# Patient Record
Sex: MALE | Race: WHITE | URBAN - METROPOLITAN AREA
[De-identification: names, ages, dates, MRNs, and addresses within clinical notes are randomized per-mention and may not be internally consistent; named-entity substitution may affect disease eponyms.]

---

## 2019-06-03 ENCOUNTER — TELEPHONE (OUTPATIENT)
Dept: PRIMARY CARE CLINIC | Age: 24
End: 2019-06-03

## 2019-06-03 DIAGNOSIS — Z13.220 SCREENING CHOLESTEROL LEVEL: Primary | ICD-10-CM

## 2019-06-28 ENCOUNTER — TELEPHONE (OUTPATIENT)
Dept: PRIMARY CARE CLINIC | Age: 24
End: 2019-06-28

## 2019-07-10 LAB
A/G RATIO: 2.6 (ref 1.1–2.2)
ALBUMIN SERPL-MCNC: 5.1 G/DL (ref 3.4–5)
ALP BLD-CCNC: 80 U/L (ref 40–129)
ALT SERPL-CCNC: 97 U/L (ref 10–40)
ANION GAP SERPL CALCULATED.3IONS-SCNC: 14 MMOL/L (ref 3–16)
AST SERPL-CCNC: 47 U/L (ref 15–37)
BILIRUB SERPL-MCNC: 0.4 MG/DL (ref 0–1)
BUN BLDV-MCNC: 25 MG/DL (ref 7–20)
CALCIUM SERPL-MCNC: 9.7 MG/DL (ref 8.3–10.6)
CHLORIDE BLD-SCNC: 105 MMOL/L (ref 99–110)
CHOLESTEROL, TOTAL: 181 MG/DL (ref 0–199)
CO2: 23 MMOL/L (ref 21–32)
CREAT SERPL-MCNC: 1.2 MG/DL (ref 0.9–1.3)
GFR AFRICAN AMERICAN: >60
GFR NON-AFRICAN AMERICAN: >60
GLOBULIN: 2 G/DL
GLUCOSE BLD-MCNC: 88 MG/DL (ref 70–99)
HDLC SERPL-MCNC: 32 MG/DL (ref 40–60)
LDL CHOLESTEROL CALCULATED: 116 MG/DL
POTASSIUM SERPL-SCNC: 4.3 MMOL/L (ref 3.5–5.1)
SODIUM BLD-SCNC: 142 MMOL/L (ref 136–145)
TOTAL PROTEIN: 7.1 G/DL (ref 6.4–8.2)
TRIGL SERPL-MCNC: 165 MG/DL (ref 0–150)
VLDLC SERPL CALC-MCNC: 33 MG/DL

## 2019-07-11 PROBLEM — E78.5 DYSLIPIDEMIA: Status: ACTIVE | Noted: 2019-07-11

## 2019-07-12 ENCOUNTER — OFFICE VISIT (OUTPATIENT)
Dept: PRIMARY CARE CLINIC | Age: 24
End: 2019-07-12
Payer: COMMERCIAL

## 2019-07-12 VITALS
BODY MASS INDEX: 30.24 KG/M2 | HEART RATE: 79 BPM | HEIGHT: 70 IN | DIASTOLIC BLOOD PRESSURE: 69 MMHG | WEIGHT: 211.2 LBS | SYSTOLIC BLOOD PRESSURE: 127 MMHG

## 2019-07-12 DIAGNOSIS — R74.8 ELEVATED LIVER ENZYMES: ICD-10-CM

## 2019-07-12 DIAGNOSIS — E78.5 DYSLIPIDEMIA: ICD-10-CM

## 2019-07-12 DIAGNOSIS — Z00.00 WELL ADULT EXAM: Primary | ICD-10-CM

## 2019-07-12 DIAGNOSIS — N43.40 SPERMATOCELE OF EPIDIDYMIS: ICD-10-CM

## 2019-07-12 PROCEDURE — 99395 PREV VISIT EST AGE 18-39: CPT | Performed by: FAMILY MEDICINE

## 2019-07-12 ASSESSMENT — ENCOUNTER SYMPTOMS
EYE PAIN: 0
NAUSEA: 0
VOMITING: 0
ABDOMINAL PAIN: 0
SHORTNESS OF BREATH: 0
RHINORRHEA: 0
DIARRHEA: 0
COUGH: 0
CONSTIPATION: 0
SORE THROAT: 0
COLOR CHANGE: 0

## 2019-07-12 ASSESSMENT — PATIENT HEALTH QUESTIONNAIRE - PHQ9
SUM OF ALL RESPONSES TO PHQ9 QUESTIONS 1 & 2: 1
2. FEELING DOWN, DEPRESSED OR HOPELESS: 0
SUM OF ALL RESPONSES TO PHQ QUESTIONS 1-9: 1
1. LITTLE INTEREST OR PLEASURE IN DOING THINGS: 1
SUM OF ALL RESPONSES TO PHQ QUESTIONS 1-9: 1

## 2019-07-12 NOTE — PATIENT INSTRUCTIONS
You may receive a survey regarding the care you received during your visit. Your input is valuable to us. We encourage you to complete and return your survey. We hope you will choose us in the future for your healthcare needs. Wt Readings from Last 3 Encounters:   07/12/19 211 lb 3.2 oz (95.8 kg)   06/24/14 164 lb (74.4 kg) (71 %, Z= 0.54)*   06/20/14 171 lb (77.6 kg) (78 %, Z= 0.77)*     * Growth percentiles are based on [facility] (Boys, 2-20 Years) data. BP Readings from Last 3 Encounters:   07/12/19 127/69   06/24/14 104/74   06/20/14 (!) 126/98       Here are some recommendations for weight loss:  Check into The GI Diet or \"Primal diet\"    Take your desired weight in pounds and multiply by 10 and that is your average daily calorie allowance. For example if you wish to weigh 170 lb x 10 = 1700 lloyd/day (this is how to gradually lose the weight and maintain your desired weight). Avoid soda/coke and all \"wet carbs\" => Drink ice water instead    Drink a large glass of ice water before meals and EAT SLOWLY (talk while you eat)! Rethink your hunger => it means your losing weight. Minimize carbohydrates as they stimulate your appetite.   Avoid Bread, Rice, Pasta and Potatoes      Avoid eating calories after 6 pm    Consider getting [employer] emma for your phone  Consider getting a FitBit to track your activity

## 2020-07-09 ENCOUNTER — NURSE ONLY (OUTPATIENT)
Dept: PRIMARY CARE CLINIC | Age: 25
End: 2020-07-09
Payer: COMMERCIAL

## 2020-07-09 PROCEDURE — 99211 OFF/OP EST MAY X REQ PHY/QHP: CPT | Performed by: NURSE PRACTITIONER

## 2020-07-09 NOTE — PROGRESS NOTES
Charlie Davis received a viral test for COVID-19. They were educated on isolation and quarantine as appropriate. For any symptoms, they were directed to seek care from their PCP, given contact information to establish with a doctor, directed to an urgent care or the emergency room.

## 2020-07-13 LAB
SARS-COV-2: NOT DETECTED
SOURCE: NORMAL

## 2020-10-14 ENCOUNTER — OFFICE VISIT (OUTPATIENT)
Dept: PRIMARY CARE CLINIC | Age: 25
End: 2020-10-14
Payer: COMMERCIAL

## 2020-10-14 PROCEDURE — 99211 OFF/OP EST MAY X REQ PHY/QHP: CPT | Performed by: NURSE PRACTITIONER

## 2020-10-14 NOTE — PROGRESS NOTES
Luís Mora received a viral test for COVID-19. They were educated on isolation and quarantine as appropriate. For any symptoms, they were directed to seek care from their PCP, given contact information to establish with a doctor, directed to an urgent care or the emergency room.

## 2020-10-15 LAB — SARS-COV-2, PCR: NOT DETECTED

## 2020-10-15 NOTE — RESULT ENCOUNTER NOTE

## 2022-01-27 ENCOUNTER — OFFICE VISIT (OUTPATIENT)
Dept: PRIMARY CARE CLINIC | Age: 27
End: 2022-01-27
Payer: COMMERCIAL

## 2022-01-27 VITALS
DIASTOLIC BLOOD PRESSURE: 71 MMHG | SYSTOLIC BLOOD PRESSURE: 127 MMHG | TEMPERATURE: 97.5 F | WEIGHT: 228 LBS | BODY MASS INDEX: 32.64 KG/M2 | HEART RATE: 79 BPM | HEIGHT: 70 IN

## 2022-01-27 DIAGNOSIS — R53.83 FATIGUE, UNSPECIFIED TYPE: Primary | ICD-10-CM

## 2022-01-27 PROCEDURE — 99213 OFFICE O/P EST LOW 20 MIN: CPT | Performed by: FAMILY MEDICINE

## 2022-01-27 SDOH — ECONOMIC STABILITY: FOOD INSECURITY: WITHIN THE PAST 12 MONTHS, THE FOOD YOU BOUGHT JUST DIDN'T LAST AND YOU DIDN'T HAVE MONEY TO GET MORE.: NEVER TRUE

## 2022-01-27 SDOH — ECONOMIC STABILITY: FOOD INSECURITY: WITHIN THE PAST 12 MONTHS, YOU WORRIED THAT YOUR FOOD WOULD RUN OUT BEFORE YOU GOT MONEY TO BUY MORE.: NEVER TRUE

## 2022-01-27 ASSESSMENT — PATIENT HEALTH QUESTIONNAIRE - PHQ9
10. IF YOU CHECKED OFF ANY PROBLEMS, HOW DIFFICULT HAVE THESE PROBLEMS MADE IT FOR YOU TO DO YOUR WORK, TAKE CARE OF THINGS AT HOME, OR GET ALONG WITH OTHER PEOPLE: 0
1. LITTLE INTEREST OR PLEASURE IN DOING THINGS: 0
8. MOVING OR SPEAKING SO SLOWLY THAT OTHER PEOPLE COULD HAVE NOTICED. OR THE OPPOSITE, BEING SO FIGETY OR RESTLESS THAT YOU HAVE BEEN MOVING AROUND A LOT MORE THAN USUAL: 0
3. TROUBLE FALLING OR STAYING ASLEEP: 0
SUM OF ALL RESPONSES TO PHQ QUESTIONS 1-9: 2
SUM OF ALL RESPONSES TO PHQ9 QUESTIONS 1 & 2: 0
5. POOR APPETITE OR OVEREATING: 0
SUM OF ALL RESPONSES TO PHQ QUESTIONS 1-9: 2
SUM OF ALL RESPONSES TO PHQ QUESTIONS 1-9: 2
4. FEELING TIRED OR HAVING LITTLE ENERGY: 2
2. FEELING DOWN, DEPRESSED OR HOPELESS: 0
7. TROUBLE CONCENTRATING ON THINGS, SUCH AS READING THE NEWSPAPER OR WATCHING TELEVISION: 0
SUM OF ALL RESPONSES TO PHQ QUESTIONS 1-9: 2
6. FEELING BAD ABOUT YOURSELF - OR THAT YOU ARE A FAILURE OR HAVE LET YOURSELF OR YOUR FAMILY DOWN: 0

## 2022-01-27 ASSESSMENT — SOCIAL DETERMINANTS OF HEALTH (SDOH): HOW HARD IS IT FOR YOU TO PAY FOR THE VERY BASICS LIKE FOOD, HOUSING, MEDICAL CARE, AND HEATING?: NOT HARD AT ALL

## 2022-01-27 NOTE — PROGRESS NOTES
Chief Complaint   Patient presents with    Insomnia    Sleep Apnea       HPI: Javier Freed  presents for evaluation and management of difficulty with sleep. Javier Shown notes that he is snoring very loudly. It is disrupting other members of the household and he states he sounds like his father who uses CPAP. He notes he sometimes wakes up with a headache in the morning and also sometimes feels tired. He is not napping currently. Today's PHQ:    PHQ Scores 1/27/2022 7/12/2019   PHQ2 Score 0 1   PHQ9 Score 2 1     Interpretation of Total Score Depression Severity: 1-4 = Minimal depression, 5-9 = Mild depression, 10-14 = Moderate depression, 15-19 = Moderately severe depression, 20-27 = Severe depression        Review of Systems    No Known Allergies  New Prescriptions    No medications on file     Current Outpatient Medications   Medication Sig Dispense Refill    mometasone (NASONEX) 50 MCG/ACT nasal spray 2 sprays by Nasal route daily. 1 Inhaler 3     No current facility-administered medications for this visit. Past Medical History:   Diagnosis Date    Collar bone fracture     Dyslipidemia 7/11/2019    Glossitis 6/20/2014    Leg fracture, left     Seasonal allergies          Objective   /71 (Site: Right Upper Arm, Position: Sitting, Cuff Size: Medium Adult)   Pulse 79   Temp 97.5 °F (36.4 °C) (Axillary)   Ht 5' 10\" (1.778 m)   Wt 228 lb (103.4 kg)   BMI 32.71 kg/m²   Wt Readings from Last 3 Encounters:   01/27/22 228 lb (103.4 kg)   07/12/19 211 lb 3.2 oz (95.8 kg)   06/24/14 164 lb (74.4 kg) (71 %, Z= 0.54)*     * Growth percentiles are based on CDC (Boys, 2-20 Years) data. Physical Exam  Constitutional:       Appearance: He is well-developed. HENT:      Mouth/Throat:      Comments: Class III airway  Neck:      Comments: 16 inch neck  Cardiovascular:      Rate and Rhythm: Normal rate and regular rhythm. Heart sounds: No murmur heard. No friction rub. No gallop.     Pulmonary: Effort: Pulmonary effort is normal.      Breath sounds: Normal breath sounds. No wheezing or rales. Abdominal:      General: Bowel sounds are normal. There is no distension. Palpations: Abdomen is soft. There is no mass. Tenderness: There is no abdominal tenderness. Skin:     General: Skin is warm and dry. Findings: No rash. Chemistry        Component Value Date/Time     07/10/2019 1341    K 4.3 07/10/2019 1341     07/10/2019 1341    CO2 23 07/10/2019 1341    BUN 25 (H) 07/10/2019 1341    CREATININE 1.2 07/10/2019 1341        Component Value Date/Time    CALCIUM 9.7 07/10/2019 1341    ALKPHOS 80 07/10/2019 1341    AST 47 (H) 07/10/2019 1341    ALT 97 (H) 07/10/2019 1341    BILITOT 0.4 07/10/2019 1341          Lab Results   Component Value Date    WBC 5.6 06/20/2014    HGB 16.4 06/20/2014    HCT 46.6 06/20/2014    MCV 80.9 06/20/2014     06/20/2014     No results found for: LABA1C  No results found for: EAG  No results found for: LABA1C  No components found for: CHLPL  Lab Results   Component Value Date    TRIG 165 (H) 07/10/2019    TRIG 194 (H) 04/10/2013     Lab Results   Component Value Date    HDL 32 (L) 07/10/2019    HDL 26 (L) 04/10/2013     Lab Results   Component Value Date    LDLCALC 116 (H) 07/10/2019    LDLCALC 72 04/10/2013     Lab Results   Component Value Date    LABVLDL 33 07/10/2019    LABVLDL 39 04/10/2013         Assessment   Plan   1. Fatigue, unspecified type  With remarkable snoring. Patient brought in recordings of his snoring with him today. Suspect he may have sleep apnea. We will consult sleep medicine and follow-up as needed  - Nick Davis MD, Pulmonary, [facility]    Discussed use, benefit, and side effects of prescribed medications. Barriers to medication compliance addressed. All patient questions answered. Pt voiced understanding. RTC No follow-ups on file.

## 2022-01-27 NOTE — Clinical Note
Dr. Otoniel Nesbitt, I think Pearletha Peabody may have sleep apnea. Would you assist me with an evaluation? Thanks.   Patsy Pepe

## 2022-01-28 ENCOUNTER — TELEPHONE (OUTPATIENT)
Dept: PULMONOLOGY | Age: 27
End: 2022-01-28

## 2022-01-28 NOTE — TELEPHONE ENCOUNTER
Dr Marissa Fuentes   Pt did call and was scheduled on 2-15-22  Do you need to seen sooner ? Or is the 15th ok?

## 2022-02-15 ENCOUNTER — OFFICE VISIT (OUTPATIENT)
Dept: PULMONOLOGY | Age: 27
End: 2022-02-15
Payer: COMMERCIAL

## 2022-02-15 VITALS
OXYGEN SATURATION: 98 % | RESPIRATION RATE: 16 BRPM | TEMPERATURE: 97.6 F | BODY MASS INDEX: 32.93 KG/M2 | SYSTOLIC BLOOD PRESSURE: 120 MMHG | WEIGHT: 230 LBS | DIASTOLIC BLOOD PRESSURE: 80 MMHG | HEART RATE: 104 BPM | HEIGHT: 70 IN

## 2022-02-15 DIAGNOSIS — R06.81 WITNESSED EPISODE OF APNEA: Primary | ICD-10-CM

## 2022-02-15 PROCEDURE — 99203 OFFICE O/P NEW LOW 30 MIN: CPT | Performed by: INTERNAL MEDICINE

## 2022-02-15 ASSESSMENT — ENCOUNTER SYMPTOMS: RESPIRATORY NEGATIVE: 1

## 2022-02-15 NOTE — ASSESSMENT & PLAN NOTE
·  Rick Simon's history of insomnia, snoring, witnessed apneic events, daily morning headaches, obesity, nocturia raises concern for the possibility of underlying obstructive sleep apnea syndrome. I have discussed with the patient as to what obstructive sleep apnea syndrome is and it's health related adverse consequences. Its treatment modalities including the use of CPAP, oral appliances and surgical procedures have also been discussed. CPAP being noninvasive and very effective would generally be considered first-line treatment for obstructive sleep apnea syndrome, in the absence of any contraindications to it's use. As a first step I have recommended a home sleep study with reflex to AutoPap if positive this has been discussed with him and he is agreeable to this plan. ·  he should maintain an optimal body weight as this would help further in the management of any underlying sleep apnea. .    · followup has been advised in 3 months after his sleep study. · if obstructive sleep apnea syndrome, is found and is prescribed CPAP, then he would need to use it for at least 4 hours a night in order to meet minimum insurance reimbursement requirements for CPAP. However to derive optimal benefit from CPAP, preferably he should use it for the entire duration of his sleep.

## 2022-02-15 NOTE — Clinical Note
[facility] Pulmonology Sleep and Critical Care  [facility]. [address] [facility]  Phone: [phone]  Fax: [phone]    Malika Karimi MD        February 15, 2022     Patient: Bambi Tam   YOB: 1995   Date of Visit: 2/15/2022       To Whom It May Concern: It is my medical opinion that Ana Levy {Work release (duty restriction):58315}. If you have any questions or concerns, please don't hesitate to call.     Sincerely,        Malika Karimi MD

## 2022-02-15 NOTE — PROGRESS NOTES
[address] [place] [address] (:  1995) is a 23 y.o. male,New patient, here for evaluation of the following chief complaint(s):  New Patient and Sleep Apnea (2013 sleep study  since symptoms increasing )         ASSESSMENT/PLAN:  1. Witnessed episode of apnea  Assessment & Plan:  ·  Judith Simon's history of insomnia, snoring, witnessed apneic events, daily morning headaches, obesity, nocturia raises concern for the possibility of underlying obstructive sleep apnea syndrome. I have discussed with the patient as to what obstructive sleep apnea syndrome is and it's health related adverse consequences. Its treatment modalities including the use of CPAP, oral appliances and surgical procedures have also been discussed. CPAP being noninvasive and very effective would generally be considered first-line treatment for obstructive sleep apnea syndrome, in the absence of any contraindications to it's use. As a first step I have recommended a home sleep study with reflex to AutoPap if positive this has been discussed with him and he is agreeable to this plan. ·  he should maintain an optimal body weight as this would help further in the management of any underlying sleep apnea. .    · followup has been advised in 3 months after his sleep study. · if obstructive sleep apnea syndrome, is found and is prescribed CPAP, then he would need to use it for at least 4 hours a night in order to meet minimum insurance reimbursement requirements for CPAP. However to derive optimal benefit from CPAP, preferably he should use it for the entire duration of his sleep. Orders:  -     Home Sleep Study; Future      No follow-ups on file. Future Appointments   Date Time Provider [place]ana Whitlock   2022  9:40 AM Lorenzo Wilson MD [facility]            Subjective   SUBJECTIVE/OBJECTIVE:  Presents for evaluation of possible obstructive sleep apnea.   Patient states his dad has sleep apnea and he is beginning to have the same symptoms. Specifically he reports:  Loud snorer  Wakes up frequently, witnessed apneic events. BMI >30  Daily am headaches  Nightly nocturia    Does not fall asleep while driving. No flowsheet data found. EPWORTH - 12      Review of Systems   Constitutional: Negative. HENT: Negative. Respiratory: Negative. Cardiovascular: Negative. Musculoskeletal: Negative. Neurological: Positive for headaches. Psychiatric/Behavioral: Negative. Objective   Physical Exam     Gen:  No acute distress. Eyes: PERRL. EOMI. Anicteric sclera. No conjunctival injection. ENT: No discharge. oropharynx clear. External appearance of ears and nose normal.  Neck: Trachea midline. No mass   Resp:  No crackles. No wheezes. No rhonchi. No dullness on percussion. CV: Regular rate. Regular rhythm. No murmur or rub. No edema. GI: Soft, Non-tender. Non-distended. +BS  Skin: Warm, dry, w/o erythema. Lymph: No cervical or supraclavicular LAD. M/S: No cyanosis. No clubbing. Neuro:  no focal neurologic deficit. Moves all extremities  Psych: Awake and alert, Oriented x 3. Judgement and insight appropriate. Mood stable. I spent 33 minutes with the patient, >50% was face-to-face in discussion of the diagnosis and the coordination of care in regards to the treatment plan. All questions answered. Note from Dr. Cam Conde reviewed as part of this visit. An electronic signature was used to authenticate this note.     --Mason Mcdowell MD

## 2022-02-15 NOTE — Clinical Note
[facility] Pulmonology Sleep and Critical Care  [place]   Phone: [phone]  Fax: [phone]    Rafita Funk MD        February 15, 2022     Patient: Glenda Cochran   YOB: 1995   Date of Visit: 2/15/2022       To Whom it May Concern:    Chin Lee was seen in my clinic on 2/15/2022. He {Return to school/sport/work:34837}. If you have any questions or concerns, please don't hesitate to call.     Sincerely,         Rafita Funk MD

## 2022-02-15 NOTE — LETTER
[facility] Pulmonology Sleep and Critical Care  [facility] [address]   Phone: [phone]  Fax: [phone]           Kayla Rodríguez MD      February 15, 2022     Patient: Ryland Blizzard   MR Number: <[id number]>   YOB: 1995   Date of Visit: 2/15/2022       Dear Dr. Chaves Console: Thank you for referring Kimmy Rios to me for evaluation/treatment. Below are the relevant portions of my assessment and plan of care. ASSESSMENT/PLAN:  1. Witnessed episode of apnea  -     Home Sleep Study; Future      No follow-ups on file. Future Appointments   Date Time Provider [place]ana Whitlock   5/19/2022  9:40 AM Kayla Rodríguez  [facility]       If you have questions, please do not hesitate to call me. I look forward to following Leslie Martin along with you.     Sincerely,        Kayla Rodríguez MD    CC providers:  Toma Escobar MD  [address]  [address] [place] [address]  [address]

## 2022-02-15 NOTE — LETTER
[facility] Pulmonology Sleep and Critical Care  [facility]. [address] [facility]  Phone: [phone]  Fax: [phone]    Ania Sims MD    February 15, 2022     Janki Jacobs MD  [address] [place] [address]    Patient: Hermelindo Arriola   MR Number: <[id number]>   YOB: 1995   Date of Visit: 2/15/2022       Dear Janki Jacobs:    Thank you for referring Valiant Batch to me for evaluation/treatment. Below are the relevant portions of my assessment and plan of care. ASSESSMENT/PLAN:  1. Witnessed episode of apnea  -     Home Sleep Study; Future      No follow-ups on file. Future Appointments   Date Time Provider [place]ana Whitlock   5/19/2022  9:40 AM Ania Sims MD [facility] PULM [facility]       If you have questions, please do not hesitate to call me. I look forward to following Paul Granda along with you.     Sincerely,      Ania Sims MD

## 2022-02-25 ENCOUNTER — HOSPITAL ENCOUNTER (OUTPATIENT)
Dept: SLEEP CENTER | Age: 27
Discharge: HOME | End: 2022-02-25
Payer: COMMERCIAL

## 2022-02-25 DIAGNOSIS — R06.81 WITNESSED EPISODE OF APNEA: ICD-10-CM

## 2022-02-25 PROCEDURE — 95806 SLEEP STUDY UNATT&RESP EFFT: CPT

## 2022-03-02 ENCOUNTER — TELEPHONE (OUTPATIENT)
Dept: PULMONOLOGY | Age: 27
End: 2022-03-02

## 2022-03-02 NOTE — TELEPHONE ENCOUNTER
Sleep study    Moderate ISABEL  AHI  19.4 an hr  O2 sat 85%    Dr recommends  Auto pap 5-15    Pt notified  Will call back with DME

## 2022-03-02 NOTE — TELEPHONE ENCOUNTER
Ashley Ivey calls back and will use [facility] as his new DME. Please send orders to [facility]. Patient was informed from [facility] they were not sure when machine would be available due to cpap machine shortage. Patient has new insurance coverage with [employer]  And is attached.

## 2022-03-09 PROCEDURE — 95806 SLEEP STUDY UNATT&RESP EFFT: CPT | Performed by: PSYCHIATRY & NEUROLOGY

## 2022-07-06 ENCOUNTER — OFFICE VISIT (OUTPATIENT)
Dept: PULMONOLOGY | Age: 27
End: 2022-07-06
Payer: COMMERCIAL

## 2022-07-06 VITALS
SYSTOLIC BLOOD PRESSURE: 100 MMHG | OXYGEN SATURATION: 97 % | BODY MASS INDEX: 32.21 KG/M2 | WEIGHT: 225 LBS | HEART RATE: 89 BPM | HEIGHT: 70 IN | DIASTOLIC BLOOD PRESSURE: 80 MMHG | TEMPERATURE: 98.4 F | RESPIRATION RATE: 16 BRPM

## 2022-07-06 DIAGNOSIS — G47.33 OSA (OBSTRUCTIVE SLEEP APNEA): Primary | ICD-10-CM

## 2022-07-06 PROCEDURE — 99213 OFFICE O/P EST LOW 20 MIN: CPT | Performed by: INTERNAL MEDICINE

## 2022-07-06 ASSESSMENT — SLEEP AND FATIGUE QUESTIONNAIRES
ESS TOTAL SCORE: 9
HOW LIKELY ARE YOU TO NOD OFF OR FALL ASLEEP WHILE SITTING INACTIVE IN A PUBLIC PLACE: 1
HOW LIKELY ARE YOU TO NOD OFF OR FALL ASLEEP WHILE SITTING AND READING: 1
HOW LIKELY ARE YOU TO NOD OFF OR FALL ASLEEP IN A CAR, WHILE STOPPED FOR A FEW MINUTES IN TRAFFIC: 0
HOW LIKELY ARE YOU TO NOD OFF OR FALL ASLEEP WHEN YOU ARE A PASSENGER IN A CAR FOR AN HOUR WITHOUT A BREAK: 2
HOW LIKELY ARE YOU TO NOD OFF OR FALL ASLEEP WHILE SITTING AND TALKING TO SOMEONE: 0
HOW LIKELY ARE YOU TO NOD OFF OR FALL ASLEEP WHILE LYING DOWN TO REST IN THE AFTERNOON WHEN CIRCUMSTANCES PERMIT: 2
HOW LIKELY ARE YOU TO NOD OFF OR FALL ASLEEP WHILE SITTING QUIETLY AFTER LUNCH WITHOUT ALCOHOL: 2
HOW LIKELY ARE YOU TO NOD OFF OR FALL ASLEEP WHILE WATCHING TV: 1

## 2022-07-06 ASSESSMENT — ENCOUNTER SYMPTOMS: RESPIRATORY NEGATIVE: 1

## 2022-07-06 NOTE — PROGRESS NOTES
[address] [facility] (:  1995) is a 33 y.o. male,New patient, here for evaluation of the following chief complaint(s):  Sleep Apnea and Follow-up         ASSESSMENT/PLAN:  1. ISABEL (obstructive sleep apnea)  Assessment & Plan: Moderate ISABEL  AHI  19.4 an hr  O2 sat 85%  -On AutoPap 5-15  -Is having trouble with acclamation  -Will decrease initial pressure and prolong ramp time  -Have encouraged him to wear while awake to help acclamation process  -Advised him he needs to be wearing for more than 4 hours preferably for the entire duration of sleep  -We will send an order for full facemask  -Return to clinic in 30 days for compliance recheck      Return in about 4 weeks (around 8/3/2022). Future Appointments   Date Time Provider [place]ana Whitlock   2022  9:00 AM Jose Alfredo Bermudez MD [facility]            Subjective   SUBJECTIVE/OBJECTIVE:  Since last visit patient had PSG that showed moderate ISABEL with an AHI of 19 O2 rohan 85%. -Started on AutoPap 5-15 but is having trouble acclimating only wearing to hours a night at most.  -Has worn 0 of 30 nights for greater than 4 hours for compliance rate of 0%    Sleep Medicine 2022   Sitting and reading 1   Watching TV 1   Sitting, inactive in a public place (e.g. a theatre or a meeting) 1   As a passenger in a car for an hour without a break 2   Lying down to rest in the afternoon when circumstances permit 2   Sitting and talking to someone 0   Sitting quietly after a lunch without alcohol 2   In a car, while stopped for a few minutes in traffic 0   Total score 9     EPWORTH - 9      Review of Systems   Constitutional: Negative. HENT: Negative. Respiratory: Negative. Cardiovascular: Negative. Musculoskeletal: Negative. Neurological: Positive for headaches. Psychiatric/Behavioral: Negative. Objective   Physical Exam     Gen:  No acute distress. Eyes: PERRL. EOMI.  Anicteric sclera. No conjunctival injection. ENT: No discharge. oropharynx clear. External appearance of ears and nose normal.  Neck: Trachea midline. No mass   Resp:  No crackles. No wheezes. No rhonchi. No dullness on percussion. CV: Regular rate. Regular rhythm. No murmur or rub. No edema. GI: Soft, Non-tender. Non-distended. +BS  Skin: Warm, dry, w/o erythema. Lymph: No cervical or supraclavicular LAD. M/S: No cyanosis. No clubbing. Neuro:  no focal neurologic deficit. Moves all extremities  Psych: Awake and alert, Oriented x 3. Judgement and insight appropriate. Mood stable. I spent 21 minutes with the patient, >50% was face-to-face in discussion of the diagnosis and the coordination of care in regards to the treatment plan. All questions answered. Note from Dr. Eron iDaz reviewed as part of this visit. An electronic signature was used to authenticate this note.     --Iliana Kimble MD

## 2022-07-06 NOTE — ASSESSMENT & PLAN NOTE
Moderate ISABEL  AHI  19.4 an hr  O2 sat 85%  -On AutoPap 5-15  -Is having trouble with acclamation  -Will decrease initial pressure and prolong ramp time  -Have encouraged him to wear while awake to help acclamation process  -Advised him he needs to be wearing for more than 4 hours preferably for the entire duration of sleep  -We will send an order for full facemask  -Return to clinic in 30 days for compliance recheck

## 2022-08-04 ENCOUNTER — OFFICE VISIT (OUTPATIENT)
Dept: PULMONOLOGY | Age: 27
End: 2022-08-04
Payer: COMMERCIAL

## 2022-08-04 VITALS
TEMPERATURE: 97.5 F | DIASTOLIC BLOOD PRESSURE: 70 MMHG | SYSTOLIC BLOOD PRESSURE: 110 MMHG | WEIGHT: 227 LBS | RESPIRATION RATE: 16 BRPM | HEART RATE: 76 BPM | BODY MASS INDEX: 32.5 KG/M2 | OXYGEN SATURATION: 97 % | HEIGHT: 70 IN

## 2022-08-04 DIAGNOSIS — G47.33 OSA (OBSTRUCTIVE SLEEP APNEA): Primary | ICD-10-CM

## 2022-08-04 PROCEDURE — G8417 CALC BMI ABV UP PARAM F/U: HCPCS | Performed by: INTERNAL MEDICINE

## 2022-08-04 PROCEDURE — G8427 DOCREV CUR MEDS BY ELIG CLIN: HCPCS | Performed by: INTERNAL MEDICINE

## 2022-08-04 PROCEDURE — 99213 OFFICE O/P EST LOW 20 MIN: CPT | Performed by: INTERNAL MEDICINE

## 2022-08-04 PROCEDURE — 1036F TOBACCO NON-USER: CPT | Performed by: INTERNAL MEDICINE

## 2022-08-04 ASSESSMENT — SLEEP AND FATIGUE QUESTIONNAIRES
HOW LIKELY ARE YOU TO NOD OFF OR FALL ASLEEP IN A CAR, WHILE STOPPED FOR A FEW MINUTES IN TRAFFIC: 0
ESS TOTAL SCORE: 4
HOW LIKELY ARE YOU TO NOD OFF OR FALL ASLEEP WHILE SITTING QUIETLY AFTER LUNCH WITHOUT ALCOHOL: 0
HOW LIKELY ARE YOU TO NOD OFF OR FALL ASLEEP WHILE SITTING AND READING: 1
HOW LIKELY ARE YOU TO NOD OFF OR FALL ASLEEP WHILE SITTING INACTIVE IN A PUBLIC PLACE: 0
HOW LIKELY ARE YOU TO NOD OFF OR FALL ASLEEP WHILE SITTING AND TALKING TO SOMEONE: 0
HOW LIKELY ARE YOU TO NOD OFF OR FALL ASLEEP WHILE LYING DOWN TO REST IN THE AFTERNOON WHEN CIRCUMSTANCES PERMIT: 1
HOW LIKELY ARE YOU TO NOD OFF OR FALL ASLEEP WHEN YOU ARE A PASSENGER IN A CAR FOR AN HOUR WITHOUT A BREAK: 1
HOW LIKELY ARE YOU TO NOD OFF OR FALL ASLEEP WHILE WATCHING TV: 1

## 2022-08-04 NOTE — PROGRESS NOTES
[address] [facility] (:  1995) is a 33 y.o. male,New patient, here for evaluation of the following chief complaint(s):  Sleep Apnea and Follow-up         ASSESSMENT/PLAN:  1. ISABEL (obstructive sleep apnea)  Assessment & Plan: Moderate ISABEL  AHI  19.4 an hr  O2 sat 85%  -On AutoPap 5-15  -Still having trouble with acclamation  -Back on nasal pillows  -We discussed the need to wear for more than 4 hours, every night, preferably for the entire duration of sleep, he states he will continue to work on this.  -We will see him back for compliance report  -Has SUN machine      Return in about 3 months (around 2022). No future appointments. Subjective   SUBJECTIVE/OBJECTIVE:  moderate ISABEL with an AHI of 19 O2 rohan 85%. -AutoPap 5-15 having trouble with compliance  -Has worn 2 of 30 nights for greater than 4 hours for compliance rate of 6%    Sleep Medicine 2022   Sitting and reading 1 1   Watching TV 1 1   Sitting, inactive in a public place (e.g. a theatre or a meeting) 0 1   As a passenger in a car for an hour without a break 1 2   Lying down to rest in the afternoon when circumstances permit 1 2   Sitting and talking to someone 0 0   Sitting quietly after a lunch without alcohol 0 2   In a car, while stopped for a few minutes in traffic 0 0   Total score 4 9     EPWORTH - 9      Review of Systems   All other systems reviewed and are negative. Objective   Physical Exam     Gen:  No acute distress. Eyes: PERRL. EOMI. Anicteric sclera. No conjunctival injection. ENT: No discharge. oropharynx clear. External appearance of ears and nose normal.  Neck: Trachea midline. No mass   Resp:  No crackles. No wheezes. No rhonchi. No dullness on percussion. CV: Regular rate. Regular rhythm. No murmur or rub. No edema. GI: Soft, Non-tender. Non-distended. +BS  Skin: Warm, dry, w/o erythema.    Lymph: No cervical or supraclavicular LAD. M/S: No cyanosis. No clubbing. Neuro:  no focal neurologic deficit. Moves all extremities  Psych: Awake and alert, Oriented x 3. Judgement and insight appropriate. Mood stable. I spent 22 minutes with the patient, >50% was face-to-face in discussion of the diagnosis and the coordination of care in regards to the treatment plan. All questions answered. Note from Dr. Ben Wadsworth reviewed as part of this visit. An electronic signature was used to authenticate this note.     --Lauren Madrigal MD

## 2022-11-22 ENCOUNTER — OFFICE VISIT (OUTPATIENT)
Dept: PULMONOLOGY | Age: 27
End: 2022-11-22
Payer: COMMERCIAL

## 2022-11-22 VITALS
HEART RATE: 112 BPM | TEMPERATURE: 96.9 F | OXYGEN SATURATION: 96 % | DIASTOLIC BLOOD PRESSURE: 70 MMHG | WEIGHT: 226.8 LBS | BODY MASS INDEX: 32.47 KG/M2 | SYSTOLIC BLOOD PRESSURE: 100 MMHG | HEIGHT: 70 IN | RESPIRATION RATE: 16 BRPM

## 2022-11-22 DIAGNOSIS — G47.33 OSA (OBSTRUCTIVE SLEEP APNEA): Primary | ICD-10-CM

## 2022-11-22 PROCEDURE — G8417 CALC BMI ABV UP PARAM F/U: HCPCS | Performed by: INTERNAL MEDICINE

## 2022-11-22 PROCEDURE — G8484 FLU IMMUNIZE NO ADMIN: HCPCS | Performed by: INTERNAL MEDICINE

## 2022-11-22 PROCEDURE — 99213 OFFICE O/P EST LOW 20 MIN: CPT | Performed by: INTERNAL MEDICINE

## 2022-11-22 PROCEDURE — G8427 DOCREV CUR MEDS BY ELIG CLIN: HCPCS | Performed by: INTERNAL MEDICINE

## 2022-11-22 PROCEDURE — 1036F TOBACCO NON-USER: CPT | Performed by: INTERNAL MEDICINE

## 2022-11-22 NOTE — ASSESSMENT & PLAN NOTE
Moderate ISABEL  AHI  19.4 an hr  O2 sat 85%  -On AutoPap 5-15  -Fully compliant as per HPI. Tolerating well.   Minimal leaks, low residual AHI  -Has SUN machine

## 2022-11-22 NOTE — PROGRESS NOTES
[address] [facility] (:  1995) is a 33 y.o. male,New patient, here for evaluation of the following chief complaint(s):  Sleep Apnea         ASSESSMENT/PLAN:  1. ISABEL (obstructive sleep apnea)  Assessment & Plan: Moderate ISABEL  AHI  19.4 an hr  O2 sat 85%  -On AutoPap 5-15  -Fully compliant as per HPI. Tolerating well. Minimal leaks, low residual AHI  -Has SUN machine      Return in about 6 months (around 2023). Future Appointments   Date Time Provider [place]ana Whitlock   2023  9:00 AM Eugenie Doty MD [facility] PULM [facility]              Subjective   SUBJECTIVE/OBJECTIVE:  moderate ISABEL with an AHI of 19 O2 rohan 85%. -AutoPap 5-15 having trouble with compliance  -Has worn 71/90 nights for greater than 4 hours for compliance rate of 79%  -residual AHI 1.1, minimal leaks    Sleep Medicine 2022   Sitting and reading 1 1   Watching TV 1 1   Sitting, inactive in a public place (e.g. a theatre or a meeting) 0 1   As a passenger in a car for an hour without a break 1 2   Lying down to rest in the afternoon when circumstances permit 1 2   Sitting and talking to someone 0 0   Sitting quietly after a lunch without alcohol 0 2   In a car, while stopped for a few minutes in traffic 0 0   [place] Sleepiness Score 4 9         Review of Systems   All other systems reviewed and are negative. Objective   Physical Exam     Gen:  No acute distress. Eyes: PERRL. EOMI. Anicteric sclera. No conjunctival injection. ENT: No discharge. oropharynx clear. External appearance of ears and nose normal.  Neck: Trachea midline. No mass   Resp:  No crackles. No wheezes. No rhonchi. No dullness on percussion. CV: Regular rate. Regular rhythm. No murmur or rub. No edema. GI: Soft, Non-tender. Non-distended. +BS  Skin: Warm, dry, w/o erythema. Lymph: No cervical or supraclavicular LAD. M/S: No cyanosis. No clubbing.     Neuro:  no focal neurologic deficit. Moves all extremities  Psych: Awake and alert, Oriented x 3. Judgement and insight appropriate. Mood stable. I spent 20 minutes on this case today, >50% was face-to-face in discussion of the diagnosis and the coordination of care in regards to the treatment plan. All questions answered. An electronic signature was used to authenticate this note.     --Arnold Glover MD

## 2023-08-03 ENCOUNTER — OFFICE VISIT (OUTPATIENT)
Dept: PULMONOLOGY | Age: 28
End: 2023-08-03
Payer: COMMERCIAL

## 2023-08-03 VITALS
SYSTOLIC BLOOD PRESSURE: 120 MMHG | BODY MASS INDEX: 33.99 KG/M2 | RESPIRATION RATE: 16 BRPM | TEMPERATURE: 98.1 F | DIASTOLIC BLOOD PRESSURE: 80 MMHG | HEART RATE: 79 BPM | OXYGEN SATURATION: 97 % | HEIGHT: 70 IN | WEIGHT: 237.4 LBS

## 2023-08-03 DIAGNOSIS — G47.33 OSA (OBSTRUCTIVE SLEEP APNEA): Primary | ICD-10-CM

## 2023-08-03 PROCEDURE — 99213 OFFICE O/P EST LOW 20 MIN: CPT | Performed by: INTERNAL MEDICINE

## 2023-08-03 ASSESSMENT — SLEEP AND FATIGUE QUESTIONNAIRES
HOW LIKELY ARE YOU TO NOD OFF OR FALL ASLEEP WHILE SITTING AND READING: 1
HOW LIKELY ARE YOU TO NOD OFF OR FALL ASLEEP WHILE LYING DOWN TO REST IN THE AFTERNOON WHEN CIRCUMSTANCES PERMIT: 2
ESS TOTAL SCORE: 5
HOW LIKELY ARE YOU TO NOD OFF OR FALL ASLEEP WHILE SITTING QUIETLY AFTER LUNCH WITHOUT ALCOHOL: 0
HOW LIKELY ARE YOU TO NOD OFF OR FALL ASLEEP WHILE SITTING AND TALKING TO SOMEONE: 0
HOW LIKELY ARE YOU TO NOD OFF OR FALL ASLEEP WHILE SITTING INACTIVE IN A PUBLIC PLACE: 1
HOW LIKELY ARE YOU TO NOD OFF OR FALL ASLEEP WHEN YOU ARE A PASSENGER IN A CAR FOR AN HOUR WITHOUT A BREAK: 0
HOW LIKELY ARE YOU TO NOD OFF OR FALL ASLEEP IN A CAR, WHILE STOPPED FOR A FEW MINUTES IN TRAFFIC: 0
HOW LIKELY ARE YOU TO NOD OFF OR FALL ASLEEP WHILE WATCHING TV: 1

## 2023-08-03 NOTE — ASSESSMENT & PLAN NOTE
Moderate ISABEL  AHI  19.4, O2 rohan 85%  -On AutoPap 5-15, he is not wearing for> 4 hours more than 75% of the nights  -We will bring him back in 90 days for repeat compliance check  -Has SUN machine

## 2023-08-03 NOTE — PROGRESS NOTES
[address] [place] [address] (:  1995) is a 33 y.o. male,New patient, here for evaluation of the following chief complaint(s):  Sleep Apnea         ASSESSMENT/PLAN:  1. ISABEL (obstructive sleep apnea)  Assessment & Plan: Moderate ISABEL  AHI  19.4, O2 rohan 85%  -On AutoPap 5-15, he is not wearing for> 4 hours more than 75% of the nights  -We will bring him back in 90 days for repeat compliance check  -Has SUN machine        Return in about 3 months (around 11/3/2023). No future appointments. Subjective   SUBJECTIVE/OBJECTIVE:  moderate ISABEL with an AHI of 19 O2 rohan 85%. -AutoPap 5-15, having issues with compliance. He states he just does not wear most of the time. Has only worn 2 nights of the last week and overall has worn less than 75% of nights for greater than 4 hours.  -residual AHI 1.4, minimal leaks    Sleep Medicine 8/3/2023 2022 2022   Sitting and reading 1 1 1   Watching TV 1 1 1   Sitting, inactive in a public place (e.g. a theatre or a meeting) 1 0 1   As a passenger in a car for an hour without a break 0 1 2   Lying down to rest in the afternoon when circumstances permit 2 1 2   Sitting and talking to someone 0 0 0   Sitting quietly after a lunch without alcohol 0 0 2   In a car, while stopped for a few minutes in traffic 0 0 0   [place] Sleepiness Score 5 4 9         Review of Systems   All other systems reviewed and are negative. Objective   Physical Exam     Gen:  No acute distress. Eyes: PERRL. EOMI. Anicteric sclera. No conjunctival injection. ENT: No discharge. oropharynx clear. External appearance of ears and nose normal.  Neck: Trachea midline. No mass   Resp:  No crackles. No wheezes. No rhonchi. No dullness on percussion. CV: Regular rate. Regular rhythm. No murmur or rub. No edema. GI: Soft, Non-tender. Non-distended. +BS  Skin: Warm, dry, w/o erythema. Lymph: No cervical or supraclavicular LAD.

## 2023-11-08 ENCOUNTER — OFFICE VISIT (OUTPATIENT)
Dept: PULMONOLOGY | Age: 28
End: 2023-11-08
Payer: COMMERCIAL

## 2023-11-08 VITALS
BODY MASS INDEX: 34.67 KG/M2 | WEIGHT: 242.2 LBS | HEART RATE: 103 BPM | HEIGHT: 70 IN | RESPIRATION RATE: 16 BRPM | TEMPERATURE: 97.4 F | SYSTOLIC BLOOD PRESSURE: 110 MMHG | OXYGEN SATURATION: 96 % | DIASTOLIC BLOOD PRESSURE: 80 MMHG

## 2023-11-08 DIAGNOSIS — G47.33 OSA (OBSTRUCTIVE SLEEP APNEA): Primary | ICD-10-CM

## 2023-11-08 PROCEDURE — 99213 OFFICE O/P EST LOW 20 MIN: CPT | Performed by: INTERNAL MEDICINE

## 2023-11-08 ASSESSMENT — SLEEP AND FATIGUE QUESTIONNAIRES
HOW LIKELY ARE YOU TO NOD OFF OR FALL ASLEEP WHILE SITTING AND TALKING TO SOMEONE: 0
HOW LIKELY ARE YOU TO NOD OFF OR FALL ASLEEP IN A CAR, WHILE STOPPED FOR A FEW MINUTES IN TRAFFIC: 0
HOW LIKELY ARE YOU TO NOD OFF OR FALL ASLEEP WHILE SITTING QUIETLY AFTER LUNCH WITHOUT ALCOHOL: 1
HOW LIKELY ARE YOU TO NOD OFF OR FALL ASLEEP WHILE LYING DOWN TO REST IN THE AFTERNOON WHEN CIRCUMSTANCES PERMIT: 2
HOW LIKELY ARE YOU TO NOD OFF OR FALL ASLEEP WHILE WATCHING TV: 1
HOW LIKELY ARE YOU TO NOD OFF OR FALL ASLEEP WHEN YOU ARE A PASSENGER IN A CAR FOR AN HOUR WITHOUT A BREAK: 1
HOW LIKELY ARE YOU TO NOD OFF OR FALL ASLEEP WHILE SITTING AND READING: 1
HOW LIKELY ARE YOU TO NOD OFF OR FALL ASLEEP WHILE SITTING INACTIVE IN A PUBLIC PLACE: 0
ESS TOTAL SCORE: 6

## 2023-11-08 NOTE — ASSESSMENT & PLAN NOTE
Moderate ISABEL  AHI  19.4, O2 rohan 85%  -On AutoPap 5-15, he is  wearing for> 4 hours more 80% of nights  -Has Blurr machine

## 2023-11-08 NOTE — PROGRESS NOTES
to the treatment plan. All questions answered. An electronic signature was used to authenticate this note.     --Maria Elena Donovan MD

## 2024-05-21 ENCOUNTER — OFFICE VISIT (OUTPATIENT)
Dept: PULMONOLOGY | Age: 29
End: 2024-05-21
Payer: COMMERCIAL

## 2024-05-21 VITALS
HEIGHT: 70 IN | RESPIRATION RATE: 18 BRPM | OXYGEN SATURATION: 97 % | TEMPERATURE: 96.6 F | DIASTOLIC BLOOD PRESSURE: 80 MMHG | WEIGHT: 242 LBS | HEART RATE: 94 BPM | BODY MASS INDEX: 34.65 KG/M2 | SYSTOLIC BLOOD PRESSURE: 122 MMHG

## 2024-05-21 DIAGNOSIS — G47.33 OSA (OBSTRUCTIVE SLEEP APNEA): Primary | ICD-10-CM

## 2024-05-21 PROCEDURE — 99213 OFFICE O/P EST LOW 20 MIN: CPT | Performed by: INTERNAL MEDICINE

## 2024-05-21 ASSESSMENT — SLEEP AND FATIGUE QUESTIONNAIRES
HOW LIKELY ARE YOU TO NOD OFF OR FALL ASLEEP WHILE SITTING QUIETLY AFTER LUNCH WITHOUT ALCOHOL: SLIGHT CHANCE OF DOZING
HOW LIKELY ARE YOU TO NOD OFF OR FALL ASLEEP IN A CAR, WHILE STOPPED FOR A FEW MINUTES IN TRAFFIC: WOULD NEVER DOZE
HOW LIKELY ARE YOU TO NOD OFF OR FALL ASLEEP WHEN YOU ARE A PASSENGER IN A CAR FOR AN HOUR WITHOUT A BREAK: SLIGHT CHANCE OF DOZING
HOW LIKELY ARE YOU TO NOD OFF OR FALL ASLEEP WHEN YOU ARE A PASSENGER IN A CAR FOR AN HOUR WITHOUT A BREAK: SLIGHT CHANCE OF DOZING
HOW LIKELY ARE YOU TO NOD OFF OR FALL ASLEEP IN A CAR, WHILE STOPPED FOR A FEW MINUTES IN TRAFFIC: WOULD NEVER DOZE
HOW LIKELY ARE YOU TO NOD OFF OR FALL ASLEEP WHILE WATCHING TV: SLIGHT CHANCE OF DOZING
HOW LIKELY ARE YOU TO NOD OFF OR FALL ASLEEP WHILE SITTING INACTIVE IN A PUBLIC PLACE: SLIGHT CHANCE OF DOZING
ESS TOTAL SCORE: 6
HOW LIKELY ARE YOU TO NOD OFF OR FALL ASLEEP WHILE LYING DOWN TO REST IN THE AFTERNOON WHEN CIRCUMSTANCES PERMIT: SLIGHT CHANCE OF DOZING
HOW LIKELY ARE YOU TO NOD OFF OR FALL ASLEEP WHILE SITTING AND TALKING TO SOMEONE: WOULD NEVER DOZE
HOW LIKELY ARE YOU TO NOD OFF OR FALL ASLEEP WHILE SITTING AND READING: SLIGHT CHANCE OF DOZING
HOW LIKELY ARE YOU TO NOD OFF OR FALL ASLEEP WHILE SITTING INACTIVE IN A PUBLIC PLACE: SLIGHT CHANCE OF DOZING
HOW LIKELY ARE YOU TO NOD OFF OR FALL ASLEEP WHILE SITTING QUIETLY AFTER LUNCH WITHOUT ALCOHOL: SLIGHT CHANCE OF DOZING
HOW LIKELY ARE YOU TO NOD OFF OR FALL ASLEEP WHILE WATCHING TV: SLIGHT CHANCE OF DOZING
HOW LIKELY ARE YOU TO NOD OFF OR FALL ASLEEP WHILE SITTING AND READING: SLIGHT CHANCE OF DOZING
HOW LIKELY ARE YOU TO NOD OFF OR FALL ASLEEP WHILE SITTING AND TALKING TO SOMEONE: WOULD NEVER DOZE
HOW LIKELY ARE YOU TO NOD OFF OR FALL ASLEEP WHILE LYING DOWN TO REST IN THE AFTERNOON WHEN CIRCUMSTANCES PERMIT: SLIGHT CHANCE OF DOZING

## 2024-05-21 NOTE — ASSESSMENT & PLAN NOTE
Moderate ISABEL  AHI  19.4, O2 rohan 85%  -On AutoPap 5-15, he is  wearing for> 4 hours more 89% of nights  -Has [employer] machine

## 2024-05-21 NOTE — PROGRESS NOTES
[facility] PULMONARY, SLEEP, AND CRITICAL CARE    Wiliam Simon (:  1995) is a 29 y.o. male,New patient, here for evaluation of the following chief complaint(s):  Sleep Apnea      Assessment & Plan   ASSESSMENT/PLAN:  1. ISABEL (obstructive sleep apnea)  Assessment & Plan:   Moderate ISABEL  AHI  19.4, O2 rohan 85%  -On AutoPap 5-15, he is  wearing for> 4 hours more 89% of nights  -Has SUN machine          Return in about 1 year (around 2025).  No future appointments.               Subjective   SUBJECTIVE/OBJECTIVE:  moderate ISABEL with an AHI of 19 O2 rohan 85%.  -AutoPap 5-15  -residual AHI 1.5, minimal leaks  -has worn 80/90 days for >4 hours for a compliance rate of 89%          2024     7:34 AM 2023     8:00 AM 8/3/2023     8:48 AM 2022     9:00 AM 2022    10:48 AM   Sleep Medicine   Sitting and reading 1 1 1 1 1   Watching TV 1 1 1 1 1   Sitting, inactive in a public place (e.g. a theatre or a meeting) 1 0 1 0 1   As a passenger in a car for an hour without a break 1 1 0 1 2   Lying down to rest in the afternoon when circumstances permit 1 2 2 1 2   Sitting and talking to someone 0 0 0 0 0   Sitting quietly after a lunch without alcohol 1 1 0 0 2   In a car, while stopped for a few minutes in traffic 0 0 0 0 0   [place] Sleepiness Score 6 6 5 4 9         Review of Systems   All other systems reviewed and are negative.         Objective   Physical Exam     Gen:  No acute distress.   Eyes: PERRL. EOMI. Anicteric sclera. No conjunctival injection.   Resp:  No crackles. No wheezes. No rhonchi. No dullness on percussion.  CV: Regular rate. Regular rhythm. No murmur or rub. No edema.   GI: Soft, Non-tender. Non-distended. +BS  Psych: Awake and alert, Oriented x 3.  Judgement and insight appropriate.  Mood stable.    I spent 22 minutes on this case today, >50% was face-to-face in discussion of the diagnosis and the coordination of care in regards to the treatment plan.  All

## 2025-05-29 ENCOUNTER — OFFICE VISIT (OUTPATIENT)
Dept: PULMONOLOGY | Age: 30
End: 2025-05-29
Payer: COMMERCIAL

## 2025-05-29 VITALS
RESPIRATION RATE: 18 BRPM | BODY MASS INDEX: 34.9 KG/M2 | OXYGEN SATURATION: 95 % | WEIGHT: 243.8 LBS | DIASTOLIC BLOOD PRESSURE: 80 MMHG | HEIGHT: 70 IN | TEMPERATURE: 97.2 F | HEART RATE: 100 BPM | SYSTOLIC BLOOD PRESSURE: 120 MMHG

## 2025-05-29 DIAGNOSIS — G47.33 OSA (OBSTRUCTIVE SLEEP APNEA): Primary | ICD-10-CM

## 2025-05-29 PROCEDURE — 99213 OFFICE O/P EST LOW 20 MIN: CPT | Performed by: INTERNAL MEDICINE

## 2025-05-29 NOTE — ASSESSMENT & PLAN NOTE
Moderate ISABEL  AHI  19.4, O2 rohan 85%  -On AutoPap 5-15, fully compliant as per HPI  -Has SUN machine

## 2025-05-29 NOTE — PROGRESS NOTES
[facility] PULMONARY, SLEEP, AND CRITICAL CARE    Wiliam Simon (:  1995) is a 30 y.o. male,New patient, here for evaluation of the following chief complaint(s):  Sleep Apnea (SUN)      Assessment & Plan   ASSESSMENT/PLAN:  1. ISABEL (obstructive sleep apnea)  Assessment & Plan:   Moderate ISABEL  AHI  19.4, O2 rohan 85%  -On AutoPap 5-15, fully compliant as per HPI  -Has SUN machine            Return in about 1 year (around 2026).  Future Appointments   Date Time Provider Department Center   6/15/2026  9:00 AM Kalen Hathaway MD  PUL[facility]                  Subjective   SUBJECTIVE/OBJECTIVE:  moderate ISABEL with an AHI of 19 O2 rohan 85%.  -AutoPap 5-15  -residual AHI 0.8, minimal leaks  -has worn 83/90 days for >4 hours for a compliance rate of 92.2%          2024     7:34 AM 2023     8:00 AM 8/3/2023     8:48 AM 2022     9:00 AM 2022    10:48 AM   Sleep Medicine   Sitting and reading 1 1 1 1 1   Watching TV 1 1 1 1 1   Sitting, inactive in a public place (e.g. a theatre or a meeting) 1 0 1 0 1   As a passenger in a car for an hour without a break 1 1 0 1 2   Lying down to rest in the afternoon when circumstances permit 1 2 2 1 2   Sitting and talking to someone 0 0 0 0 0   Sitting quietly after a lunch without alcohol 1 1 0 0 2   In a car, while stopped for a few minutes in traffic 0 0 0 0 0   [place] Sleepiness Score 6 6 5 4 9         Review of Systems   All other systems reviewed and are negative.         Objective   Physical Exam     Gen:  No acute distress.   Eyes: PERRL. EOMI. Anicteric sclera. No conjunctival injection.   Resp:  No crackles. No wheezes. No rhonchi. No dullness on percussion.  CV: Regular rate. Regular rhythm. No murmur or rub. No edema.   GI: Soft, Non-tender. Non-distended. +BS  Psych: Awake and alert, Oriented x 3.  Judgement and insight appropriate.  Mood stable.    I spent 25 minutes on this case today, >50% was face-to-face in discussion of the